# Patient Record
Sex: MALE | HISPANIC OR LATINO | Employment: UNEMPLOYED | URBAN - METROPOLITAN AREA
[De-identification: names, ages, dates, MRNs, and addresses within clinical notes are randomized per-mention and may not be internally consistent; named-entity substitution may affect disease eponyms.]

---

## 2018-10-08 ENCOUNTER — HOSPITAL ENCOUNTER (EMERGENCY)
Facility: HOSPITAL | Age: 33
Discharge: HOME | End: 2018-10-08
Attending: EMERGENCY MEDICINE

## 2018-10-08 VITALS
DIASTOLIC BLOOD PRESSURE: 79 MMHG | RESPIRATION RATE: 20 BRPM | HEART RATE: 98 BPM | TEMPERATURE: 98 F | WEIGHT: 200 LBS | BODY MASS INDEX: 30.31 KG/M2 | HEIGHT: 68 IN | OXYGEN SATURATION: 99 % | SYSTOLIC BLOOD PRESSURE: 127 MMHG

## 2018-10-08 DIAGNOSIS — T78.40XA ALLERGIC REACTION, INITIAL ENCOUNTER: Primary | ICD-10-CM

## 2018-10-08 PROCEDURE — 63600175 PHARM REV CODE 636 W HCPCS: Performed by: EMERGENCY MEDICINE

## 2018-10-08 PROCEDURE — 96372 THER/PROPH/DIAG INJ SC/IM: CPT

## 2018-10-08 PROCEDURE — 99284 EMERGENCY DEPT VISIT MOD MDM: CPT | Mod: 25

## 2018-10-08 RX ORDER — METHYLPREDNISOLONE 4 MG/1
TABLET ORAL
Qty: 1 PACKAGE | Refills: 0 | Status: SHIPPED | OUTPATIENT
Start: 2018-10-08 | End: 2018-10-29

## 2018-10-08 RX ORDER — METHYLPREDNISOLONE SOD SUCC 125 MG
125 VIAL (EA) INJECTION
Status: COMPLETED | OUTPATIENT
Start: 2018-10-08 | End: 2018-10-08

## 2018-10-08 RX ORDER — CEPHALEXIN 500 MG/1
500 CAPSULE ORAL 4 TIMES DAILY
Qty: 20 CAPSULE | Refills: 0 | Status: SHIPPED | OUTPATIENT
Start: 2018-10-08 | End: 2018-10-13

## 2018-10-08 RX ADMIN — METHYLPREDNISOLONE SODIUM SUCCINATE 125 MG: 125 INJECTION, POWDER, FOR SOLUTION INTRAMUSCULAR; INTRAVENOUS at 02:10

## 2018-10-08 NOTE — ED NOTES
Visible swelling and redness to face along cheeks and jaw line. Pt stated he was trying to dye his beard a darker color with a color he has never used before. ABC's intact, denies throat swelling or itching mostly a burning sensation. JACKIE. Friend at bedside.

## 2018-10-08 NOTE — ED PROVIDER NOTES
Encounter Date: 10/8/2018    SCRIBE #1 NOTE: I, Oscar Duarte, am scribing for, and in the presence of, Dr. Blandon .       History     Chief Complaint   Patient presents with    Allergic Reaction     hair dye to beard this weekend     Facial Swelling     Time seen by provider: 1:49 PM on 10/08/2018      Eliceo Avila is a 32 y.o. male with no significant medical history who presents to the ED for further evaluation of an allergic reaction that has been worsening for the last 3 days. Patient states that he was attempting to dye his beard over the weekend, when he noticed he started to have an reaction to the dye. Patient states that he started to have redness and facial swelling. He states that since then it has worsened with the redness around his cheeks. Patient denies drainage, trouble swallowing, sore throat, vocal changes, open sores, and fever. Patient denies any other complaints.       The history is provided by the patient. History limited by:  was offered but denied. No  was used.     Review of patient's allergies indicates:  No Known Allergies  History reviewed. No pertinent past medical history.  History reviewed. No pertinent surgical history.  History reviewed. No pertinent family history.  Social History     Tobacco Use    Smoking status: Current Some Day Smoker   Substance Use Topics    Alcohol use: Not on file    Drug use: Not on file     Review of Systems  REVIEW OF SYSTEMS  CONSTITUTIONAL: Negative for fever.  HEENT:  Negative for sore throat, trouble swallowing, and vocal changes. Positive for facial swelling.   HEART:   Negative for chest pain..  LUNG:  Negative for shortness of breath.  ABDOMEN:  Negative for nausea.   :  No discharge, dysuria  EXTREMITIES:  No swelling  NEURO:  Negative for weakness.   SKIN:  Positive rash. Negative wound  Psych: No depression  HEME: Does not bruise/bleed easily.           Physical Exam     Initial Vitals [10/08/18  1314]   BP Pulse Resp Temp SpO2   127/79 98 20 98 °F (36.7 °C) 99 %      MAP       --         Physical Exam    Nursing note and vitals reviewed.  Constitutional: He appears well-developed and well-nourished.  Non-toxic appearance. No distress.   HENT:   Head: Normocephalic and atraumatic.   Mouth/Throat: Uvula is midline. No posterior oropharyngeal edema.   Eyes: EOM are normal. Pupils are equal, round, and reactive to light.   Neck: Trachea normal, normal range of motion and phonation normal. Neck supple. No stridor present. No neck rigidity. No JVD present.   Abdominal: There is no rigidity.   Musculoskeletal: Normal range of motion.   Neurological: He is alert and oriented to person, place, and time. He has normal strength and normal reflexes. No cranial nerve deficit or sensory deficit. He exhibits normal muscle tone. Coordination normal. GCS eye subscore is 4. GCS verbal subscore is 5. GCS motor subscore is 6.   Skin: Skin is warm and dry. Rash noted.   Swelling to the bilateral lower face. Well demarginalized erythema outlining the beard with warmth and no discharge.    Psychiatric: He has a normal mood and affect. His speech is normal and behavior is normal. He is not actively hallucinating.         ED Course   Procedures  Labs Reviewed - No data to display       Imaging Results    None          Medical Decision Making:   History:   Old Medical Records: I decided to obtain old medical records.  Initial Assessment:   Patient is a 32-year-old man who presents emergency department for evaluation facial rash that occurred after applying a dye to his beard.  Has been taking antifungal ointment and Benadryl with mild improvement.  Rash is consistent with localized allergic reaction.  He is given IM Solu-Medrol in the emergency department.  I will discharge him with Medrol Dosepak and he is to continue taking Benadryl and discontinue using the products.  Return precautions discussed.  He will also be given  prophylactic antibiotics to prevent superinfection.  He is discharged improved in no acute distress.            Scribe Attestation:   Scribe #1: I performed the above scribed service and the documentation accurately describes the services I performed. I attest to the accuracy of the note.    I, Barber Roberson, personally performed the services described in this documentation. All medical record entries made by the scribe were at my direction and in my presence.  I have reviewed the chart and agree that the record reflects my personal performance and is accurate and complete. Julian Blandon MD.  6:24 PM 10/08/2018             Clinical Impression:   The encounter diagnosis was Allergic reaction, initial encounter.      Disposition:   Disposition: Discharged  Condition: Stable                        Julian Blandon MD  10/08/18 [id number]